# Patient Record
Sex: FEMALE | Race: WHITE | NOT HISPANIC OR LATINO | Employment: UNEMPLOYED | URBAN - METROPOLITAN AREA
[De-identification: names, ages, dates, MRNs, and addresses within clinical notes are randomized per-mention and may not be internally consistent; named-entity substitution may affect disease eponyms.]

---

## 2020-04-09 ENCOUNTER — HOSPITAL ENCOUNTER (EMERGENCY)
Facility: HOSPITAL | Age: 30
Discharge: HOME | End: 2020-04-09
Attending: EMERGENCY MEDICINE
Payer: MEDICAID

## 2020-04-09 VITALS
RESPIRATION RATE: 20 BRPM | OXYGEN SATURATION: 100 % | WEIGHT: 170 LBS | HEART RATE: 97 BPM | DIASTOLIC BLOOD PRESSURE: 98 MMHG | TEMPERATURE: 99 F | SYSTOLIC BLOOD PRESSURE: 123 MMHG | BODY MASS INDEX: 28.29 KG/M2

## 2020-04-09 DIAGNOSIS — S10.95XA FOREIGN BODY OF NECK: ICD-10-CM

## 2020-04-09 LAB
B-HCG UR QL: NEGATIVE
CTP QC/QA: YES

## 2020-04-09 PROCEDURE — 81025 URINE PREGNANCY TEST: CPT | Performed by: EMERGENCY MEDICINE

## 2020-04-09 PROCEDURE — 99283 EMERGENCY DEPT VISIT LOW MDM: CPT | Mod: 25

## 2020-04-09 RX ORDER — NALOXONE HYDROCHLORIDE 4 MG/.1ML
SPRAY NASAL
Qty: 1 EACH | Refills: 11 | Status: SHIPPED | OUTPATIENT
Start: 2020-04-09

## 2020-04-09 NOTE — ED NOTES
Pt reports that she was trying to inject her neck vein and the needle broke off into her skin, she was not even able to inject the heroin into her vein but was successful at another site with another syringe. Pt AAOx4, Abc's intact. NADN. No adverse reaction to the heroin she injected per her own words.

## 2020-04-09 NOTE — ED NOTES
Call received from another, stating that call was wrong number. Registration notified that the patient information is incorrect. MD aware.

## 2020-04-09 NOTE — ED NOTES
Pt left before referral was given, Nurse called number in chart [phone], left voicemail that referral is at ER for .

## 2020-04-09 NOTE — ED PROVIDER NOTES
Encounter Date: 4/9/2020       History     Chief Complaint   Patient presents with    Foreign Body in Skin     patient reports broken needle in neck     This patient is a 29-year-old female with a past history of intravenous heroin abuse presenting with complaints of foreign body stuck in the front left lower portion of her neck.  She reports she was injecting heroin into the vein in the left lower neck when she broke off portion of a diabetic needle.  She denies difficulty breathing, change in voice, cough that is acute, bleeding from the wound, chest pain, fever.  She denies she has additional retained foreign bodies in the neck.        Review of patient's allergies indicates:  No Known Allergies  History reviewed. No pertinent past medical history.  History reviewed. No pertinent surgical history.  History reviewed. No pertinent family history.  Social History     Tobacco Use    Smoking status: Current Every Day Smoker   Substance Use Topics    Alcohol use: No    Drug use: Yes     Types: Marijuana, IV     Review of Systems   Constitutional: Negative for fever.   HENT: Negative for trouble swallowing.    Eyes: Negative for visual disturbance.   Respiratory: Negative for shortness of breath.    Cardiovascular: Negative for chest pain.   Gastrointestinal: Negative for abdominal pain.   Genitourinary: Negative for dysuria.   Musculoskeletal: Positive for neck pain.   Skin: Positive for color change.        Track priti overlying left external jugular vein, no stridor, no hard signs   Neurological: Negative for headaches.   Psychiatric/Behavioral: Negative for confusion.       Physical Exam     Initial Vitals [04/09/20 1248]   BP Pulse Resp Temp SpO2   (!) 123/98 97 20 98.9 °F (37.2 °C) 100 %      MAP       --         Physical Exam    Nursing note and vitals reviewed.  Constitutional: She appears well-nourished. No distress.   HENT:   Head: Normocephalic and atraumatic.   Mouth/Throat: Oropharynx is clear and moist.    Eyes: Conjunctivae and EOM are normal.   Neck: Normal range of motion. Neck supple.   Track priti overlying left external jugular vein, no stridor, no hard signs   Cardiovascular: Normal rate and regular rhythm.   Pulmonary/Chest: No respiratory distress. She has no wheezes.   Abdominal: She exhibits no distension. There is no tenderness.   Neurological: She is alert and oriented to person, place, and time.   Skin: Skin is warm and dry.   Psychiatric: She has a normal mood and affect. Thought content normal.         ED Course   Procedures  Labs Reviewed   POCT URINE PREGNANCY          Imaging Results    None          Medical Decision Making:   ED Management:  This patient was interviewed and assessed emergently.  Vital signs are stable.  She is calm and has a stable airway with normal speech and respiratory effort.  There are no significant external findings with the exception of old track marks over the left external jugular vein.  Radiograph indicates a 6 mm metallic density projecting superficially but this just overlies the IJ and is adjacent to the airway.  I cannot visualize this foreign body on ultrasound at the bedside.  I informed the patient that I am very reluctant to explore and extract this needle but rather she is provided a referral to [facility] to have either General surgery or Otolaryngology evaluate the patient for surgical removal.  Currently no indication for antibiotics.  The patient is strongly encouraged against heroin abuse and is provided information regarding heroin detox.  She is discharged with Narcan but asked return to the ER immediately for any new, concerning, or worsening symptoms.                                 Clinical Impression:       ICD-10-CM ICD-9-CM   1. Foreign body of neck S10.95XA 910.6         Disposition:   Disposition: Discharged  Condition: Stable                        Ulises Cody MD  04/09/20 9960

## 2021-02-24 ENCOUNTER — OFFICE VISIT (OUTPATIENT)
Dept: URGENT CARE | Facility: CLINIC | Age: 31
End: 2021-02-24
Payer: MEDICAID

## 2021-02-24 VITALS
BODY MASS INDEX: 36.65 KG/M2 | WEIGHT: 220 LBS | HEART RATE: 96 BPM | TEMPERATURE: 97 F | HEIGHT: 65 IN | OXYGEN SATURATION: 97 % | DIASTOLIC BLOOD PRESSURE: 73 MMHG | SYSTOLIC BLOOD PRESSURE: 127 MMHG

## 2021-02-24 DIAGNOSIS — Z02.89 ENCOUNTER TO OBTAIN EXCUSE FROM WORK: Primary | ICD-10-CM

## 2021-02-24 PROCEDURE — 99203 OFFICE O/P NEW LOW 30 MIN: CPT | Mod: S$GLB,,, | Performed by: NURSE PRACTITIONER

## 2021-02-24 PROCEDURE — 99203 PR OFFICE/OUTPT VISIT, NEW, LEVL III, 30-44 MIN: ICD-10-PCS | Mod: S$GLB,,, | Performed by: NURSE PRACTITIONER

## 2022-05-23 PROBLEM — M79.605 LEFT LEG PAIN: Status: ACTIVE | Noted: 2022-05-23

## 2022-05-23 PROBLEM — T14.90XA TRAUMA: Status: ACTIVE | Noted: 2022-05-23
